# Patient Record
Sex: MALE | Race: WHITE | NOT HISPANIC OR LATINO | URBAN - NONMETROPOLITAN AREA
[De-identification: names, ages, dates, MRNs, and addresses within clinical notes are randomized per-mention and may not be internally consistent; named-entity substitution may affect disease eponyms.]

---

## 2019-05-29 ENCOUNTER — IMPORTED ENCOUNTER (OUTPATIENT)
Dept: URBAN - NONMETROPOLITAN AREA CLINIC 1 | Facility: CLINIC | Age: 59
End: 2019-05-29

## 2019-05-29 PROBLEM — H52.4: Noted: 2019-05-29

## 2019-05-29 PROBLEM — H52.11: Noted: 2019-05-29

## 2019-05-29 PROBLEM — Z83.511: Noted: 2019-11-16

## 2019-05-29 PROBLEM — H25.13: Noted: 2019-11-16

## 2019-05-29 PROCEDURE — S0620 ROUTINE OPHTHALMOLOGICAL EXA: HCPCS

## 2019-05-29 NOTE — PATIENT DISCUSSION
Simple Myopia OD/Clinical Emmetropia OS w/Presbyopia-  discussed findings w/patient-  no spectacle Rx required-  UV protection recommended-  continue to monitor yearly or prnCataracts OU-  discussed findings w/patient-  no treatment indicated at this time-  UV protection recommended-  RTC 1 year or prn; 's Notes: Family hx of glaucomaMR 5/29/2019DFE 5/29/2019

## 2021-03-22 NOTE — PROCEDURE NOTE: CLINICAL
PROCEDURE NOTE: Removal of Corneal FB at Slit Lamp #1 OS. Diagnosis: Superficial Corneal Foreign Body. Anesthesia: Topical. The patient, the procedure, and the correct site were identified initially. Prior to treatment, the risks/benefits/alternatives were discussed. The patient wished to proceed with procedure. Corneal foreign body was removed using a 25 gauge needle at the slit lamp. Patient tolerated procedure well. There were no complications. Post-op instructions given. The residual rust ring was carefully buffed out of the corneal stroma. Patient tolerated procedure well. Zan Cee PROCEDURE NOTE: Removal of Foreign Body Rust Ring #1 OS.

## 2021-03-24 NOTE — PATIENT DISCUSSION
Monitor cataract for progression. Post-op note:  Patient underwent a vasectomy done on 4-5-17.    He was added on to my schedule today as he has increased swelling, left greater than right, not too severe, that is tender to touch.    He has not been taking any pain medications.    Assessment and Plan:  I think the best thing to do would be to treat him with Duricef x 2 weeks.  I encouraged heat, scrotal support, and non steroidal antiinflammatories.  He should follow up with me in the office as needed.  He agrees with the plan and is pleased.      This note was scribed by Jaclyn Ivan on behalf of Dr. Rosa.  All records and services performed by Dr. Rosa.The documentation recorded by the scribe accurately and completely reflects the service(s) I personally performed and the decisions made by me.

## 2021-03-24 NOTE — PATIENT DISCUSSION
Mari brush in office to remove remaining rust. Add durezol to maxitrol BID for 1 week for both to help with scar tissue and infection. Long disc with pt that central defect may lead to some scarring. Will recheck vision and defect in 1 week to make sure no corneal eval is nec with JOAILYN. He will call with any further pain or issue. Successful removal in office today.

## 2022-04-10 ASSESSMENT — VISUAL ACUITY
OS_CC: 20/20
OD_CC: 20/30

## 2022-04-10 ASSESSMENT — TONOMETRY
OS_IOP_MMHG: 14
OD_IOP_MMHG: 14

## 2022-05-26 ENCOUNTER — EMERGENCY VISIT (OUTPATIENT)
Dept: URBAN - NONMETROPOLITAN AREA CLINIC 4 | Facility: CLINIC | Age: 62
End: 2022-05-26

## 2022-05-26 DIAGNOSIS — H11.31: ICD-10-CM

## 2022-05-26 PROCEDURE — 99213 OFFICE O/P EST LOW 20 MIN: CPT

## 2022-05-26 ASSESSMENT — VISUAL ACUITY
OD_SC: 20/20
OS_SC: 20/20

## 2022-07-20 ENCOUNTER — COMPREHENSIVE EXAM (OUTPATIENT)
Dept: URBAN - NONMETROPOLITAN AREA CLINIC 4 | Facility: CLINIC | Age: 62
End: 2022-07-20

## 2022-07-20 DIAGNOSIS — H52.4: ICD-10-CM

## 2022-07-20 DIAGNOSIS — H52.11: ICD-10-CM

## 2022-07-20 PROCEDURE — S0621 ROUTINE OPHTHALMOLOGICAL EXA: HCPCS

## 2022-07-20 ASSESSMENT — TONOMETRY
OS_IOP_MMHG: 17
OD_IOP_MMHG: 18

## 2022-07-20 ASSESSMENT — VISUAL ACUITY
OD_SC: 20/25
OS_SC: 20/20-2

## 2024-03-05 NOTE — PATIENT DISCUSSION
[facility]   Rust ring was deep and removed about 2/3 today. Disc with pt that must wait and allow cornea to heal further to remove the rest of rust ring.